# Patient Record
Sex: FEMALE | Race: WHITE | NOT HISPANIC OR LATINO | URBAN - METROPOLITAN AREA
[De-identification: names, ages, dates, MRNs, and addresses within clinical notes are randomized per-mention and may not be internally consistent; named-entity substitution may affect disease eponyms.]

---

## 2021-08-07 ENCOUNTER — EMERGENCY (EMERGENCY)
Facility: HOSPITAL | Age: 56
LOS: 1 days | Discharge: HOME | End: 2021-08-07
Attending: EMERGENCY MEDICINE
Payer: MEDICAID

## 2021-08-07 VITALS
TEMPERATURE: 98 F | HEART RATE: 90 BPM | WEIGHT: 176.37 LBS | DIASTOLIC BLOOD PRESSURE: 90 MMHG | OXYGEN SATURATION: 98 % | HEIGHT: 67 IN | RESPIRATION RATE: 16 BRPM | SYSTOLIC BLOOD PRESSURE: 178 MMHG

## 2021-08-07 PROCEDURE — 96372 THER/PROPH/DIAG INJ SC/IM: CPT

## 2021-08-07 PROCEDURE — 99284 EMERGENCY DEPT VISIT MOD MDM: CPT

## 2021-08-07 PROCEDURE — 72100 X-RAY EXAM L-S SPINE 2/3 VWS: CPT | Mod: 26

## 2021-08-07 PROCEDURE — 99283 EMERGENCY DEPT VISIT LOW MDM: CPT | Mod: 25

## 2021-08-07 PROCEDURE — 72100 X-RAY EXAM L-S SPINE 2/3 VWS: CPT

## 2021-08-07 RX ORDER — LIDOCAINE 4 G/100G
1 CREAM TOPICAL ONCE
Refills: 0 | Status: COMPLETED | OUTPATIENT
Start: 2021-08-07 | End: 2021-08-07

## 2021-08-07 RX ORDER — IBUPROFEN 200 MG
1 TABLET ORAL
Qty: 56 | Refills: 0
Start: 2021-08-07 | End: 2021-08-20

## 2021-08-07 RX ORDER — DIAZEPAM 5 MG
5 TABLET ORAL ONCE
Refills: 0 | Status: DISCONTINUED | OUTPATIENT
Start: 2021-08-07 | End: 2021-08-07

## 2021-08-07 RX ORDER — KETOROLAC TROMETHAMINE 30 MG/ML
30 SYRINGE (ML) INJECTION ONCE
Refills: 0 | Status: DISCONTINUED | OUTPATIENT
Start: 2021-08-07 | End: 2021-08-07

## 2021-08-07 RX ORDER — ACETAMINOPHEN 500 MG
650 TABLET ORAL ONCE
Refills: 0 | Status: COMPLETED | OUTPATIENT
Start: 2021-08-07 | End: 2021-08-07

## 2021-08-07 RX ORDER — DIAZEPAM 5 MG
1 TABLET ORAL
Qty: 3 | Refills: 0
Start: 2021-08-07 | End: 2021-08-09

## 2021-08-07 RX ADMIN — LIDOCAINE 1 PATCH: 4 CREAM TOPICAL at 21:50

## 2021-08-07 RX ADMIN — Medication 5 MILLIGRAM(S): at 21:50

## 2021-08-07 RX ADMIN — Medication 30 MILLIGRAM(S): at 21:55

## 2021-08-07 RX ADMIN — Medication 650 MILLIGRAM(S): at 21:50

## 2021-08-07 NOTE — ED ADULT NURSE NOTE - HIV OFFER
History of Present Illness  The patient is a 62 year old female who presents today for annual exam. She had a negative mammogram recently. She denies any postmenopausal bleeding or pelvic pain. 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    History:    Past Medical History:   Diagnosis Date   • Status post laser ablation of incompetent vein        History reviewed. No pertinent family history.     Past Surgical History:   Procedure Laterality Date   • Cholesterol  2013    Total 235, HDL 82  Triglycerides 57   • Colonoscopy  2016    repeat in 3 years   • Colposcopy  1997, 3/1993   • Dilation and curettage of uterus  2005   • Endometrial ablation thermal  2005    hysteroscopy       OB History    Para Term  AB Living   5 5 5     4   SAB TAB Ectopic Molar Multiple Live Births             5      # Outcome Date GA Lbr Jaime/2nd Weight Sex Delivery Anes PTL Lv   5 Term     F Vag-Spont   SACHIN   4 Term     M Vag-Spont   SACHIN   3 Term     M Vag-Spont   DEC   2 Term     M Vag-Spont   SACHIN   1 Term     F Vag-Spont   SACHIN       Current Outpatient Medications   Medication Sig Dispense Refill   • cholecalciferol (VITAMIN D3) 1000 UNITS tablet Take 1,000 Units by mouth daily.     • Ibuprofen (ADVIL PO)        No current facility-administered medications for this visit.        ALLERGIES:  No Known Allergies    Social History     Socioeconomic History   • Marital status: /Civil Union     Spouse name: Not on file   • Number of children: Not on file   • Years of education: Not on file   • Highest education level: Not on file   Occupational History   • Not on file   Social Needs   • Financial resource strain: Not on file   • Food insecurity:     Worry: Not on file     Inability: Not on file   • Transportation needs:     Medical: Not on file     Non-medical: Not on file   Tobacco Use   • Smoking status: Never Smoker   • Smokeless tobacco: Never Used   Substance  and Sexual Activity   • Alcohol use: Yes     Alcohol/week: 0.0 oz     Comment: sociallyu   • Drug use: Never   • Sexual activity: Yes     Partners: Male   Lifestyle   • Physical activity:     Days per week: Not on file     Minutes per session: Not on file   • Stress: Not on file   Relationships   • Social connections:     Talks on phone: Not on file     Gets together: Not on file     Attends Zoroastrianism service: Not on file     Active member of club or organization: Not on file     Attends meetings of clubs or organizations: Not on file     Relationship status: Not on file   • Intimate partner violence:     Fear of current or ex partner: Not on file     Emotionally abused: Not on file     Physically abused: Not on file     Forced sexual activity: Not on file   Other Topics Concern   • Not on file   Social History Narrative   • Not on file       Health Maintenance Summary     Hepatitis C Screening (Once)  Overdue since 2/20/2006    Cervical Cancer Screening HPV CO-Testing (Every 5 Years)  Overdue since 8/20/2018    Shingles Vaccine (3 of 3)  Overdue since 9/10/2018    Colorectal Cancer Screening-Colonoscopy (Every 3 Years)  Next due on 9/19/2019    Depression Screening (Yearly)  Next due on 4/12/2020    Breast Cancer Screening (Every 2 Years)  Next due on 5/2/2021    DTaP/Tdap/Td Vaccine (2 - Td)  Next due on 7/25/2026    Influenza Vaccine   Completed    Pneumococcal Vaccine 0-64   Aged Out          Review of Systems:  CONSTITUTIONAL:  Denies fever/sweats and unintentional weight change.  CV:  Denies chest discomfort with exertion, SOB or palpitations.   RESPIRATORY:  Denies cough, SOB, change in exercise tolerance.   GI:  Denies abdominal pain, nausea, change in bowel habits, melena.   :  Denies frequency, dysuria.  MSK:  Denies joint pain, muscle aches.   SKIN:  Denies concerns, changing moles.   NEURO:  Denies changes.  PSYCH:  Denies changes.  HEME/LYMPH:  Denies abnormal bruising or bleeding, lumps or bumps.      Physical Exam:  Visit Vitals  /89   Pulse 71   Ht 5' 9.5\" (1.765 m)   Wt 84.7 kg   LMP  (Exact Date)   BMI 27.18 kg/m²       Vital Signs:   Visit Vitals  /89   Pulse 71   Ht 5' 9.5\" (1.765 m)   Wt 84.7 kg   LMP  (Exact Date)   BMI 27.18 kg/m²    Repeat blood pressure:120/84  General Appearance: Well groomed.   Neuropsychiatric: Mood and affect appropriate.    Skin: No rashes.   Neck: No masses. No enlargement of the thyroid.   Respiratory: Respiratory effort normal. Breath sounds are normal.  Cardiovascular: Regular rate and rhythm. No leg swelling or varicosities.   Breasts: Symmetrical without masses. No skin changes. No axillary lymphadenopathy.   Abdomen: No masses or tenderness. No evidence of hernia. No hepatomegaly or splenomegaly.   Pelvic:  External genitalia: Normal.  Urethral Meatus: Normal.  Urethra: No masses or tenderness.  Bladder: No cystocele.  Vagina: Normal appearance.  No discharge.   Cervix: Normal appearance.  No discharge.   Uterus: Normal  Adnexa: No masses or tenderness.   Anus/perineum: Normal.  Rectal: Normal.       Assessments & Plan:  Annual gynecological exam  Pap smear with HPV  I personally reviewed the Past Medical, Family, and Social History as documented by the clinic staff.       Opt out

## 2021-08-07 NOTE — ED PROVIDER NOTE - MUSCULOSKELETAL, MLM
Spine appears normal, range of motion is not limited, no joint tenderness. ttp at b/l lateral lumbar muscle, no underlying skin changes, no midline ttp, no step off

## 2021-08-07 NOTE — ED PROVIDER NOTE - CLINICAL SUMMARY MEDICAL DECISION MAKING FREE TEXT BOX
56 yr old female with no hx presents to ed c/o localized lumbar back pain x 1 day. pt states didn't do anything particular. recently [place]. no incontinence, no fever, no trauma, no heavy lifting, no abd pain, no dysuria, focal weakness. pt tried Tylenol land some cream without relieve. no numbness or tingling.    lumbar strain no red flags. xr of lumbar, pain meds, re-assess

## 2021-08-07 NOTE — ED PROVIDER NOTE - PATIENT PORTAL LINK FT
Spoke to patient and provided her with the number for the spine clinic and results   Patient will call back if has any questions or concerns  Please put referral into chart  You can access the FollowMyHealth Patient Portal offered by [facility] by registering at the following website: [email or web address]. By joining [employer]’s FollowMyHealth portal, you will also be able to view your health information using other applications (apps) compatible with our system.

## 2021-08-07 NOTE — ED PROVIDER NOTE - NSFOLLOWUPINSTRUCTIONS_ED_ALL_ED_FT
please use heat packs to area 20 mins 3x/day, tylenol 650mg every 4 hrs as needed, stay active, no heavy lifting and return if symptoms worsens. can get physical therapy from doctor.

## 2021-08-07 NOTE — ED PROVIDER NOTE - PROGRESS NOTE DETAILS
Zora
knox: slightly improve. ambulating and able to get up from bed.  dx lumbar strain. rx valium and motrin. please use heat packs to area 20 mins 3x/day, tylenol 650mg every 4 hrs as needed, stay active, no heavy lifting and return if symptoms worsens. can get physical therapy from doctor.

## 2021-08-07 NOTE — ED ADULT NURSE NOTE - NS_NURSE_DISC_ED_ALL_ED_PROVIDEDBY
[Time Spent: ___ minutes] : I have spent [unfilled] minutes of time on the encounter. [>50% of the face to face encounter time was spent on counseling and/or coordination of care for ___] : Greater than 50% of the face to face encounter time was spent on counseling and/or coordination of care for [unfilled] ED MD

## 2021-08-07 NOTE — ED ADULT TRIAGE NOTE - CHIEF COMPLAINT QUOTE
as per son interpreting " Patient woke up and got out of bed in a lot of pain to lower back, used advil and cream for pain but no relief since this morning "  denies injury

## 2021-08-07 NOTE — ED PROVIDER NOTE - OBJECTIVE STATEMENT
56 yr old female with no hx presents to ed c/o localized lumbar back pain x 1 day. pt states didn't do anything particular. recently came [place]. no incontinence, no fever, no trauma, no heavy lifting, no abd pain, no dysuria, focal weakness. pt tried Tylenol land some cream without relieve. no numbness or tingling.

## 2025-05-21 PROBLEM — Z00.00 ENCOUNTER FOR PREVENTIVE HEALTH EXAMINATION: Status: ACTIVE | Noted: 2025-05-21

## 2025-06-22 PROBLEM — R87.619 ABNORMAL PAP SMEAR OF CERVIX: Status: ACTIVE | Noted: 2025-06-22

## 2025-06-23 ENCOUNTER — APPOINTMENT (OUTPATIENT)
Dept: GYNECOLOGIC ONCOLOGY | Facility: CLINIC | Age: 60
End: 2025-06-23
Payer: MEDICAID

## 2025-06-23 VITALS
HEIGHT: 66 IN | RESPIRATION RATE: 16 BRPM | DIASTOLIC BLOOD PRESSURE: 78 MMHG | SYSTOLIC BLOOD PRESSURE: 182 MMHG | HEART RATE: 92 BPM | OXYGEN SATURATION: 99 % | BODY MASS INDEX: 26.52 KG/M2 | WEIGHT: 165 LBS

## 2025-06-23 PROBLEM — Z78.9 NON-SMOKER: Status: ACTIVE | Noted: 2025-06-23

## 2025-06-23 PROBLEM — N87.9 CERVICAL DYSPLASIA: Status: ACTIVE | Noted: 2025-06-23

## 2025-06-23 PROBLEM — Z86.79 HISTORY OF HYPERTENSION: Status: RESOLVED | Noted: 2025-06-23 | Resolved: 2025-06-23

## 2025-06-23 PROCEDURE — 99459 PELVIC EXAMINATION: CPT

## 2025-06-23 PROCEDURE — 57452 EXAM OF CERVIX W/SCOPE: CPT

## 2025-06-23 PROCEDURE — 99205 OFFICE O/P NEW HI 60 MIN: CPT | Mod: 25

## 2025-06-23 RX ORDER — LOSARTAN POTASSIUM 100 MG/1
TABLET, FILM COATED ORAL
Refills: 0 | Status: ACTIVE | COMMUNITY

## 2025-06-30 LAB — CORE LAB BIOPSY: NORMAL

## 2025-07-08 ENCOUNTER — OUTPATIENT (OUTPATIENT)
Dept: OUTPATIENT SERVICES | Facility: HOSPITAL | Age: 60
LOS: 1 days | End: 2025-07-08
Payer: MEDICAID

## 2025-07-08 ENCOUNTER — RESULT REVIEW (OUTPATIENT)
Age: 60
End: 2025-07-08

## 2025-07-08 DIAGNOSIS — C80.1 MALIGNANT (PRIMARY) NEOPLASM, UNSPECIFIED: ICD-10-CM

## 2025-07-08 PROCEDURE — 88141 CYTOPATH C/V INTERPRET: CPT | Mod: 59

## 2025-07-08 PROCEDURE — 88321 CONSLTJ&REPRT SLD PREP ELSWR: CPT

## 2025-07-09 LAB — CYTOLOGY SPEC DOC CYTO: SIGNIFICANT CHANGE UP

## 2025-08-05 ENCOUNTER — APPOINTMENT (OUTPATIENT)
Dept: GYNECOLOGIC ONCOLOGY | Facility: HOSPITAL | Age: 60
End: 2025-08-05

## 2025-08-18 ENCOUNTER — APPOINTMENT (OUTPATIENT)
Dept: GYNECOLOGIC ONCOLOGY | Facility: CLINIC | Age: 60
End: 2025-08-18